# Patient Record
Sex: FEMALE | Race: WHITE | NOT HISPANIC OR LATINO | URBAN - METROPOLITAN AREA
[De-identification: names, ages, dates, MRNs, and addresses within clinical notes are randomized per-mention and may not be internally consistent; named-entity substitution may affect disease eponyms.]

---

## 2020-02-14 ENCOUNTER — EMERGENCY (EMERGENCY)
Facility: HOSPITAL | Age: 35
LOS: 1 days | Discharge: HOME | End: 2020-02-14
Attending: EMERGENCY MEDICINE | Admitting: EMERGENCY MEDICINE
Payer: COMMERCIAL

## 2020-02-14 VITALS
RESPIRATION RATE: 16 BRPM | TEMPERATURE: 98 F | DIASTOLIC BLOOD PRESSURE: 89 MMHG | SYSTOLIC BLOOD PRESSURE: 129 MMHG | HEART RATE: 91 BPM | OXYGEN SATURATION: 95 %
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
## 2020-02-14 DIAGNOSIS — H92.02 OTALGIA, LEFT EAR: ICD-10-CM
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
## 2020-02-14 DIAGNOSIS — S01.312A LACERATION WITHOUT FOREIGN BODY OF LEFT EAR, INITIAL ENCOUNTER: ICD-10-CM
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
## 2020-02-14 PROCEDURE — 99283 EMERGENCY DEPT VISIT LOW MDM: CPT

## 2020-02-14 NOTE — ED PROVIDER NOTE - NSFOLLOWUPINSTRUCTIONS_ED_ALL_ED_FT
Plastics Dr. De La O (Office number [phone]), he will be available this afternoon in the office. Dr. De La O's office is at [facility] [address] ([address])  [place] [address] [id number].

## 2020-02-14 NOTE — ED PROVIDER NOTE - CLINICAL SUMMARY MEDICAL DECISION MAKING FREE TEXT BOX
36 y/o female presents with left earlobe laceration. Called plastics Dr. De La O ([phone]), he will be available this afternoon in the office. Dr. De La O's office is at [address] [place] [address] [id number]. Will provide patient with information to see plastic surgeon after 2PM today. Patient is stable for discharge.

## 2020-02-14 NOTE — ED PROVIDER NOTE - OBJECTIVE STATEMENT
36 y/o female with no significant PMHx presents to ED c/o left ear laceration. Patient reports she was pulling at her earring last night when she reports her left earlobe "ripped" and her earring fell out. Reports laceration to the left earlobe with minimal bleeding and mild pain. Denies any fever or chills. States last tetanus was approximately 9 years ago.

## 2020-02-14 NOTE — ED ADULT NURSE NOTE - CHPI ED NUR SYMPTOMS NEG
no drainage/no purulent drainage/no bleeding at site/no blood in mucus/no chills/no vomiting/no pain/no rectal pain/no redness/no fever

## 2020-02-14 NOTE — ED PROVIDER NOTE - PATIENT PORTAL LINK FT
You can access the FollowMyHealth Patient Portal offered by [facility] by registering at the following website: [email or web address]. By joining [employer]’s FollowMyHealth portal, you will also be able to view your health information using other applications (apps) compatible with our system.

## 2020-02-14 NOTE — ED ADULT NURSE NOTE - OBJECTIVE STATEMENT
34 yo F c/o ear injury. Pt states "I was playing with my earring and all of a sudden it came off in my hand and my ear lobe was ripped." Pt denies pain, no bleeding at the site at this time. Denies CP, SOB, fever/chills, N/V/D, headache, dizziness. Took pseudoephedrine today for nasal congestion and cough. Pt speaking in full complete sentences, ambulatory with steady gait.

## 2022-12-08 NOTE — ED PROVIDER NOTE - DISCHARGE DATE
-- DO NOT REPLY / DO NOT REPLY ALL --  -- Message is from [facility] [employer] ([facility]) --    General Patient Message : Krista called stated that she has been going physical therapy for her stiff neck, and the therapist suggested that she get an xray done of her neck.  Krista also mentioned that she had gone to ER last Saturday and was there for 4 hours.    Please call her back when the xray order has been placed    Caller Information       Type Contact Phone/Fax    12/08/2022 02:26 PM CST Phone (Incoming) KRISTA Matamoros (Self) [phone] (M)        Alternative phone number: [phone]      Can a detailed message be left? Yes    Message Turnaround: [place]:    Refer to site's KB page for routing instructions    Please give this turnaround time to the caller:   \"You can expect to receive a response 2-3 business days after your provider's clinical team reviews the message\"               14-Feb-2020